# Patient Record
Sex: FEMALE | Race: WHITE | Employment: UNEMPLOYED | URBAN - METROPOLITAN AREA
[De-identification: names, ages, dates, MRNs, and addresses within clinical notes are randomized per-mention and may not be internally consistent; named-entity substitution may affect disease eponyms.]

---

## 2017-05-17 ENCOUNTER — HOSPITAL ENCOUNTER (EMERGENCY)
Age: 62
Discharge: HOME | End: 2017-05-17
Attending: FAMILY MEDICINE

## 2017-05-17 VITALS
RESPIRATION RATE: 18 BRPM | BODY MASS INDEX: 17.99 KG/M2 | WEIGHT: 108 LBS | TEMPERATURE: 98.9 F | HEART RATE: 62 BPM | SYSTOLIC BLOOD PRESSURE: 112 MMHG | OXYGEN SATURATION: 97 % | DIASTOLIC BLOOD PRESSURE: 67 MMHG | HEIGHT: 65 IN
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
## 2017-05-17 RX ORDER — ATENOLOL 50 MG/1
50 TABLET ORAL DAILY
COMMUNITY

## 2017-05-17 RX ORDER — DOXYCYCLINE 100 MG/1
100 CAPSULE ORAL 2 TIMES DAILY
Qty: 20 CAP | Refills: 0 | Status: ON HOLD | OUTPATIENT
Start: 2017-05-17 | End: 2020-07-22

## 2017-05-17 RX ORDER — LEVOTHYROXINE SODIUM 75 UG/1
75 TABLET ORAL
COMMUNITY

## 2017-05-17 RX ORDER — PROGESTERONE 100 MG/1
100 CAPSULE ORAL DAILY
COMMUNITY

## 2017-05-17 NOTE — UC PROVIDER NOTE
Patient is a 59 y.o. female presenting with tick removal. The history is provided by the patient. Tick Removal    This is a new problem. The current episode started yesterday. The problem has not changed since onset. There has been no fever. Affected Location: left supraclavicular area. The pain is at a severity of 0/10. Treatments tried: apply nail polish removal on top of it         Past Medical History:   Diagnosis Date    Asthma     Ill-defined condition     SVT        Past Surgical History:   Procedure Laterality Date    NEUROLOGICAL PROCEDURE UNLISTED      subderal         History reviewed. No pertinent family history. Social History     Social History    Marital status:      Spouse name: N/A    Number of children: N/A    Years of education: N/A     Occupational History    Not on file. Social History Main Topics    Smoking status: Never Smoker    Smokeless tobacco: Not on file    Alcohol use Not on file    Drug use: Not on file    Sexual activity: Not on file     Other Topics Concern    Not on file     Social History Narrative                ALLERGIES: Dilantin [phenytoin sodium extended]; Erythromycin; Cefdinir; and Zyclara [imiquimod]    Review of Systems    Vitals:    05/17/17 1200   BP: 112/67   Pulse: 62   Resp: 18   Temp: 98.9 °F (37.2 °C)   SpO2: 97%   Weight: 49 kg (108 lb)   Height: 5' 5\" (1.651 m)       Physical Exam   Constitutional: No distress. Skin: No rash noted. No erythema. Dead tick + on left supraclavicular area- no local redness/ rash-  Tick removed completely- pulled with forceps head came out completely     Nursing note and vitals reviewed. MDM     Differential Diagnosis; Clinical Impression; Plan:     CLINICAL IMPRESSION:  Tick bite, initial encounter  (primary encounter diagnosis)      DDX    Plan:    Go to CDC. gov and [facility]. org website for more info for lyme disease    Use Doxy if redness spread- area of redness gets bigger    Amount and/or Complexity of Data Reviewed:    Review and summarize past medical records:  Yes  Risk of Significant Complications, Morbidity, and/or Mortality:   Presenting problems:  Low  Management options:  Low  Progress:   Patient progress:  Stable      Procedures

## 2017-05-17 NOTE — DISCHARGE INSTRUCTIONS
Tick Bite: Care Instructions  Your Care Instructions    Ticks are small spiderlike animals. They bite to fasten themselves onto your skin and feed on your blood. Ticks can carry diseases. But most ticks do not carry diseases, and most tick bites do not cause serious health problems. Some people may have an allergic reaction to a tick bite. This reaction may be mild, with symptoms like itching and swelling. In rare cases, a severe allergic reaction may occur. Most of the time, all you need to do for a tick bite is relieve any symptoms you may have. Follow-up care is a key part of your treatment and safety. Be sure to make and go to all appointments, and call your doctor if you are having problems. It's also a good idea to know your test results and keep a list of the medicines you take. How can you care for yourself at home? · Put ice or a cold pack on the bite for 15 to 20 minutes once an hour. Put a thin cloth between the ice and your skin. · Try an over-the-counter medicine to relieve itching, redness, swelling, and pain. Be safe with medicines. Read and follow all instructions on the label. ¨ Take an antihistamine medicine, such as chlorpheniramine (Chlor-Trimeton) or diphenhydramine (Benadryl). These medicines may help relieve itching, redness, and swelling. ¨ Use a spray of local anesthetic that contains benzocaine, such as Solarcaine. It may help relieve pain. If your skin reacts to the spray, stop using it. ¨ Put calamine lotion on the skin. It may help relieve itching. To avoid tick bites  · Avoid ticks:  ¨ Learn where ticks are found in your community, and stay away from those areas if possible. ¨ Cover as much of your body as possible when you work or play in grassy or wooded areas. ¨ Use insect repellents, such as products containing DEET. You can spray them on your skin. ¨ Take steps to control ticks on your property if you live in an area where Lyme disease occurs.  Clear leaves, brush, tall grasses, woodpiles, and stone fences from around your house and the edges of your yard or garden. This may help get rid of ticks. · When you come in from outdoors, check your body for ticks, including your groin, head, and underarms. The ticks may be about the size of a sesame seed. If no one else can help you check for ticks on your scalp, comb your hair with a fine-tooth comb. · If you find a tick, remove it quickly. Use tweezers to grasp the tick as close to its mouth (the part in your skin) as possible. Slowly pull the tick straight out--do not twist or yank--until its mouth releases from your skin. · Ticks can come into your house on clothing, outdoor gear, and pets. These ticks can fall off and attach to you. ¨ Check your clothing and outdoor gear. Remove any ticks you find. Then put your clothing in a clothes dryer on high heat for 1 hour to kill any ticks that might remain. ¨ Check your pets for ticks after they have been outdoors. · When hiking in the woods, carry a small dry jar or ziplock bag. If you find a tick on your body, remove the tick and put it in the jar or bag. Store the container in the freezer so you can give it to your doctor if symptoms develop. The tick can be tested to learn whether it is carrying the bacteria that cause Lyme disease. When should you call for help? Call 911 anytime you think you may need emergency care. For example, call if:  · You have symptoms of a severe allergic reaction. These may include:  ¨ Sudden raised, red areas (hives) all over your body. ¨ Swelling of the throat, mouth, lips, or tongue. ¨ Trouble breathing. ¨ Passing out (losing consciousness). Or you may feel very lightheaded or suddenly feel weak, confused, or restless. Call your doctor now or seek immediate medical care if:  · You have signs of infection, such as:  ¨ Increased pain, swelling, warmth, or redness around the bite. ¨ Red streaks leading from the bite.   ¨ Pus draining from the bite. ¨ A fever. Watch closely for changes in your health, and be sure to contact your doctor if:  · You develop a new rash. · You have joint pain. · You are very tired. · You have flu-like symptoms. · You have symptoms for more than 1 week. Where can you learn more? Go to http://dean-rocky.info/. Enter A035 in the search box to learn more about \"Tick Bite: Care Instructions. \"  Current as of: May 27, 2016  Content Version: 11.2  © 8353-1350 [facility]. Care instructions adapted under license by [employer] (which disclaims liability or warranty for this information). If you have questions about a medical condition or this instruction, always ask your healthcare professional. [address] any warranty or liability for your use of this information.

## 2018-12-27 ENCOUNTER — HOSPITAL ENCOUNTER (OUTPATIENT)
Dept: GENERAL RADIOLOGY | Age: 63
Discharge: HOME | End: 2018-12-27
Payer: COMMERCIAL

## 2018-12-27 DIAGNOSIS — J45.909 ASTHMA: ICD-10-CM

## 2018-12-27 PROCEDURE — 71046 X-RAY EXAM CHEST 2 VIEWS: CPT

## 2020-07-17 ENCOUNTER — HOSPITAL ENCOUNTER (OUTPATIENT)
Dept: PREADMISSION TESTING | Age: 65
Discharge: HOME | End: 2020-07-17
Payer: COMMERCIAL

## 2020-07-17 DIAGNOSIS — U07.1 COVID-19: ICD-10-CM

## 2020-07-17 PROCEDURE — 87635 SARS-COV-2 COVID-19 AMP PRB: CPT

## 2020-07-18 LAB — SARS-COV-2, COV2NT: NOT DETECTED

## 2020-07-22 ENCOUNTER — HOSPITAL ENCOUNTER (OUTPATIENT)
Age: 65
Setting detail: OUTPATIENT SURGERY
Discharge: HOME | End: 2020-07-22
Attending: INTERNAL MEDICINE | Admitting: INTERNAL MEDICINE
Payer: COMMERCIAL

## 2020-07-22 VITALS
BODY MASS INDEX: 18.64 KG/M2 | RESPIRATION RATE: 20 BRPM | DIASTOLIC BLOOD PRESSURE: 55 MMHG | OXYGEN SATURATION: 100 % | TEMPERATURE: 98.4 F | HEIGHT: 66 IN | HEART RATE: 54 BPM | WEIGHT: 116 LBS | SYSTOLIC BLOOD PRESSURE: 91 MMHG

## 2020-07-22 DIAGNOSIS — R55 SYNCOPE AND COLLAPSE: ICD-10-CM

## 2020-07-22 PROCEDURE — 74011250637 HC RX REV CODE- 250/637: Performed by: INTERNAL MEDICINE

## 2020-07-22 PROCEDURE — 93660 TILT TABLE EVALUATION: CPT | Performed by: INTERNAL MEDICINE

## 2020-07-22 RX ORDER — MOMETASONE FUROATE 50 UG/1
2 SPRAY, METERED NASAL DAILY
COMMUNITY

## 2020-07-22 RX ORDER — NITROGLYCERIN 0.4 MG/1
TABLET SUBLINGUAL AS NEEDED
Status: DISCONTINUED | OUTPATIENT
Start: 2020-07-22 | End: 2020-07-22 | Stop reason: HOSPADM

## 2020-07-22 RX ORDER — LACOSAMIDE 50 MG/1
50 TABLET ORAL 2 TIMES DAILY
COMMUNITY

## 2020-07-22 RX ORDER — MONTELUKAST SODIUM 10 MG/1
10 TABLET ORAL DAILY
COMMUNITY

## 2020-07-22 RX ORDER — BUDESONIDE 0.5 MG/2ML
500 INHALANT ORAL
COMMUNITY

## 2020-07-22 NOTE — PROCEDURES
Tilt Table Test    Pt was NPO and gave informed consent prior to the procedure. Baseline:  /61  HR 61  Pt tilted to 70 degrees  Upright tilt at 1 min:  /61  HR 79  Upright tilt at 5 min:  BP 97/63  HR  62  Upright tilt at 10 min:  BP 97/61  HT 66  Upright tilt at 15 min:  BP 96/58  HR 66  Pt returned to the supine position asymptomatic  Bilateral carotid massage was performed and was negative  1 SL NTG given  5 min post NTG:  BP 94/51  HR 60  Pt re-tilted to 70 degrees  Upright tilt at 1 min:  BP 95/51  HR 73  Upright tilt at 4 min:  BP 68/40  HR  64  Pt hot and sweaty  Upright tilt at 5 min:  BP 72/43  HR 58   Pt feeling light and weak  Returned to the supine position  Hemodynamics returned to baseline    Imp:  Positive tilt table test with evidence of neurally-mediated pre-syncope, more vasodepressor than cardio-inhibitory, following NTG provocation.    Negative carotid massage    Rec:  Salt and fluid loading to start    Markell Smith MD

## 2022-03-19 PROBLEM — R55 SYNCOPE: Status: ACTIVE | Noted: 2020-07-22

## 2023-05-12 RX ORDER — ATENOLOL 50 MG/1
50 TABLET ORAL DAILY
COMMUNITY

## 2023-05-12 RX ORDER — LACOSAMIDE 50 MG/1
TABLET ORAL 2 TIMES DAILY
COMMUNITY

## 2023-05-12 RX ORDER — MONTELUKAST SODIUM 10 MG/1
10 TABLET ORAL DAILY
COMMUNITY

## 2023-05-12 RX ORDER — PROGESTERONE 100 MG/1
100 CAPSULE ORAL DAILY
COMMUNITY

## 2023-05-12 RX ORDER — LEVOTHYROXINE SODIUM 0.07 MG/1
TABLET ORAL
COMMUNITY

## 2023-05-12 RX ORDER — MOMETASONE FUROATE 50 UG/1
2 SPRAY, METERED NASAL DAILY
COMMUNITY

## 2023-05-12 RX ORDER — BUDESONIDE 0.5 MG/2ML
INHALANT ORAL
COMMUNITY